# Patient Record
Sex: FEMALE
[De-identification: names, ages, dates, MRNs, and addresses within clinical notes are randomized per-mention and may not be internally consistent; named-entity substitution may affect disease eponyms.]

---

## 2020-04-22 ENCOUNTER — NURSE TRIAGE (OUTPATIENT)
Dept: OTHER | Facility: CLINIC | Age: 75
End: 2020-04-22

## 2020-04-22 NOTE — TELEPHONE ENCOUNTER
She states, \"I just need to know if I need to take this insulin before dinner or not. \" Informed patient that I could not provide a direct answer at the moment but I could assist her through the triage process where recommendations and care advice based on protocols would give her direction. Patient refused to move forward with triage process. Answer Assessment - Initial Assessment Questions  1. REASON FOR CALL or QUESTION: \"What is your reason for calling today? \" or \"How can I best help you? \" or \"What question do you have that I can help answer? \"      \"I only want to know if I should take my insulin before dinner. My blood sugar is 116. \"    Protocols used: INFORMATION ONLY CALL - NO TRIAGE-ADULT-[place]

## 2021-05-17 ENCOUNTER — NURSE TRIAGE (OUTPATIENT)
Dept: OTHER | Facility: CLINIC | Age: 76
End: 2021-05-17